# Patient Record
(demographics unavailable — no encounter records)

---

## 2018-01-01 NOTE — ER DOCUMENT REPORT
ED General





- General


Chief Complaint: Nonproductive Cough


Stated Complaint: FEVER


Time Seen by Provider: 10/12/18 22:33


Notes: 





Patient is a 7-month old male without chronic medical problems, born at term, up

-to-date on all immunizations who presents with 24 hours of fever, and a croupy 

cough.  Mother states that the child has a barking, seal-like cough.  Parents 

been treating symptoms at home with ibuprofen with improvement of the fever but 

no resolution of the cough.  Multiple sick contacts.  History of similar 

symptoms in the past with croup.  Child has not seen the pediatrician regarding 

today's concerns.  Child has continued to tolerate oral intake, has made plenty 

wet diapers.  No lethargy.


TRAVEL OUTSIDE OF THE [place] IN LAST 30 DAYS: No





- Related Data


Allergies/Adverse Reactions: 


 





No Known Allergies Allergy (Verified 10/12/18 20:58)


 











Past Medical History





- General


Information source: Parent





- Social History


Smoking Status: Never Smoker


Frequency of alcohol use: None


Drug Abuse: None


Lives with: Parents


Family History: Reviewed & Not Pertinent


Patient has suicidal ideation: No


Patient has homicidal ideation: No


Renal/ Medical History: Denies: Hx Peritoneal Dialysis





Review of Systems





- Review of Systems


Notes: 





See HPI, all other systems reviewed and are otherwise negative


Constitutional: No weight loss


Eyes: No eye drainage


HENT: No ear drainage, No oral lesions


Respiratory: Positive for cough


Gastrointestinal: No vomiting or diarrhea


Genitourinary: No bloody urine


Musculoskeletal:  No leg swelling


Skin: No cyanosis, No rashes


Allergic/Immunologic: No hives


Neurological: No tonic clonic jerking


Hematological: No petechiae





Physical Exam





- Vital signs


Vitals: 


 











Pulse Resp BP Pulse Ox


 


 187 H  50 H  107/72   97 


 


 10/12/18 21:23  10/12/18 21:23  10/12/18 21:23  10/12/18 21:23











Interpretation: Tachycardic, Febrile


Notes: 





Reviewed vital signs and nursing note as charted by RN. 


CONSTITUTIONAL: Well-appearing, well-nourished; calm, resting without any 

discomfort


HEAD: Normocephalic; atraumatic; No swelling


EYES: PERRL; Conjunctivae clear, no drainage; EOMI


ENT: External ears without lesions; External auditory canal is patent; TMs 

without erythema, landmarks clear and well visualized; no rhinorrhea; Pharynx 

without erythema or lesions, no tonsillar hypertrophy, airway patent, mucous 

membranes pink and moist


NECK: Supple, no cervical lymphadenopathy, no masses


CARD: Regular rate and rhythm; no murmurs, no rubs, no gallops, capillary 

refill < 2 seconds, symmetric pulses


RESP:  Respiratory rate and effort are normal. There is normal chest excursion.

  No respiratory distress, no retractions, no stridor, no nasal flaring, no 

accessory muscle use.  The lungs are clear to auscultation bilaterally, no 

wheezing, no rales, no rhonchi.  


ABD/GI: Normal bowel sounds; non-distended; soft, non-tender, no rebound, no 

guarding, no palpable organomegaly


EXT: Normal ROM in all joints; non-tender to palpation; no effusions, no edema 


SKIN: Normal color for age and race; warm; dry; good turgor; no acute lesions 

noted


NEURO: No facial asymmetry; Moves all extremities equally; Motor and sensory 

function intact





Course





- Re-evaluation


Re-evalutation: 





10/12/18 23:00


Presentation is most consistent with croup.  Child arrived overall well-

appearing, no significant respiratory distress or hypoxemia.  No retractions.  

History of barking cough at home.  No stridor to indicate need for racemic 

epinephrine.  Child was given a dose of 0.6 mg/kg of oral dexamethasone.  No 

indication for chest x-ray at this point, parents in agreement with avoiding 

imaging.  At this time will discharge with return precautions and follow-up 

recommendations.  Verbal discharge instructions given a the bedside to parents 

and opportunity for questions given. Medication warnings reviewed. Parent is in 

agreement with this plan and has verbalized understanding of return precautions 

and the need for primary care follow-up in the next 24-72 hours.





- Vital Signs


Vital signs: 


 











Temp Pulse Resp BP Pulse Ox


 


 100.9 F H  152 H  32   105/70   100 


 


 10/12/18 23:39  10/12/18 23:40  10/12/18 23:39  10/12/18 23:39  10/12/18 23:39














Discharge





- Discharge


Clinical Impression: 


 Croup





Fever


Qualifiers:


 Fever type: unspecified Qualified Code(s): R50.9 - Fever, unspecified





Condition: Good


Disposition: HOME, SELF-CARE


Additional Instructions: 


Your child has been diagnosed as having croup.  This is a viral infection that 

causes inflammation of the upper airway.  This causes a barking cough and the 

difficulty breathing.  Your child has been treated with a single dose of 

steroids here in the emergency department that will help to reduce the 

inflammation and the airway and improve their symptoms.  Please return to the 

emergency department immediately if your child begins to have worsening 

difficulty breathing, persistent vomiting, becomes lethargic, or has any other 

symptoms that are worrisome to you.  Please follow-up with your primary 

pediatrician in the next 1-2 days.


Referrals: 


ADOLFO HERNANDEZ MD [Primary Care Provider] - Follow up as needed

## 2019-08-08 NOTE — ER DOCUMENT REPORT
HPI





- HPI


Patient complains to provider of: head injury


Time Seen by Provider: 08/08/19 12:47


Onset: This morning


Onset/Duration: Sudden


Quality of pain: No pain


Pain Level: 0


Context: 





1-year-old child presents to the emergency department with his mom for 

complaints of head injury.  Mom reports child ran into the couch and has a small

swelling to the right side of his forehead and then right prior to arrival he 

ran into the wall and fell and hit his head.  Mom reports he started crying 

right away.  No change in LOC.  Mom reports child is slightly delayed he is cu

rrently under the treatment over OT PT.  Mom reports he just started walking 2 

months ago.  She reports he still a little bit clumsy on his feet.  She reports 

child is acting appropriately she became scared because he has a hematoma on the

left side of his forehead as well as the right now.  Family is visiting from [place] and will return Saturday.


Associated Symptoms: None.  denies: Vomiting


Exacerbated by: Denies


Relieved by: Denies


Similar symptoms previously: No


Recently seen / treated by doctor: No





Past Medical History





- General


Information source: Parent





- Social History


Smoking Status: Never Smoker


Cigarette use (# per day): No


Frequency of alcohol use: None


Drug Abuse: None


Lives with: Family


Family History: Reviewed & Not Pertinent


Patient has suicidal ideation: No


Patient has homicidal ideation: No





- Medical History


Medical History: Negative


Renal/ Medical History: Denies: Hx Peritoneal Dialysis


Surgical Hx: Negative





Vertical Provider Document





- CONSTITUTIONAL


Agree With Documented VS: Yes


Exam Limitations: No Limitations


General Appearance: WD/WN, No Apparent Distress





- INFECTION CONTROL


TRAVEL OUTSIDE OF THE [place] IN LAST 30 DAYS: No





- HEENT


HEENT: PERRLA.  negative: Pharyngeal Erythema, Tympanic Membrane Red, Tympanic 

Membrane Bulging


Notes: 





LARGE HEMATOMA LEFT SIDE FOREHEAD, SMALLER ABRASION SWELLING RIGHT SIDE FOREHEAD





- NECK


Neck: Normal Inspection, Supple





- RESPIRATORY


Respiratory: Breath Sounds Normal, No Respiratory Distress, Chest Non-Tender





- CARDIOVASCULAR


Cardiovascular: Regular Rate, Regular Rhythm





- GI/ABDOMEN


Gastrointestinal: Abdomen Soft, Abdomen Non-Tender





- REPRODUCTIVE


Male Genitalia: Normal Inspection





- BACK


Back: Normal Inspection





- MUSCULOSKELETAL/EXTREMETIES


Musculoskeletal/Extremeties: MAEW, FROM, Non-Tender





- NEURO


Level of Consciousness: Awake, Alert, Appropriate


Motor/Sensory: No Motor Deficit





- DERM


Integumentary: Warm, Dry





Course





- Re-evaluation


Re-evalutation: 





08/08/19 13:02


1-year-old with hematoma to the left side his forehead smaller abrasion to the 

right side of his forehead.  Mom reports he ran into the couch early this 

morning and then prior to arrival ran into a wall and fell and hit his head.  No

change in LOC.  Child looks good no other bruises throughout his whole body.  

Child is cuddling with mom.  No signs of abuse.  NO CT done based on no change 

in loc, no s/s abuse, child acting normal, no vomiting


Mom was instructed on signs and symptoms of head injury and instructed to 

monitor child for the next 24 hours very closely.  Return to the emergency 

department for concerns him not acting right vomiting.  She verbalized 

understanding to all instructions


 


 





- Vital Signs


Vital signs: 


                                        











Temp Pulse Resp BP Pulse Ox


 


 99.3 F   87 L  24   134/100   92 


 


 08/08/19 12:43  08/08/19 12:43  08/08/19 12:43  08/08/19 12:43  08/08/19 12:43














Discharge





- Discharge


Clinical Impression: 


Head injury


Qualifiers:


 Encounter type: initial encounter Qualified Code(s): S09.90XA - Unspecified 

injury of head, initial encounter





Condition: Stable


Disposition: HOME, SELF-CARE


Instructions:  Head Injury, Child (OM), Head Injury Precautions ([facility])


Additional Instructions: 


*Your child has been evaluated for a head injury


*Monitor your child as discussed return to the emergency department if he starts

vomiting, if child does not seem quite right to you


*Follow up with his pediatrician as soon as you return to [place]


*Return to ED for worsening condition, changes, needs


Referrals: 


ADOLFO HERNANDEZ MD [ACTIVE STAFF] - Follow up tomorrow